# Patient Record
Sex: MALE | Race: WHITE
[De-identification: names, ages, dates, MRNs, and addresses within clinical notes are randomized per-mention and may not be internally consistent; named-entity substitution may affect disease eponyms.]

---

## 2019-06-26 ENCOUNTER — HOSPITAL ENCOUNTER (OUTPATIENT)
Dept: HOSPITAL 47 - RADUSWWP | Age: 59
Discharge: HOME | End: 2019-06-26
Payer: MEDICARE

## 2019-06-26 DIAGNOSIS — N40.0: Primary | ICD-10-CM

## 2019-06-26 PROCEDURE — 76872 US TRANSRECTAL: CPT

## 2019-06-27 NOTE — US
EXAMINATION TYPE: US prostate transrectal

 

DATE OF EXAM: 6/26/2019

 

COMPARISON: NONE

 

CLINICAL HISTORY: R97.2 Elevated PSA levels.

 

This examination was performed using the transrectal probe. 

 

EXAM MEASUREMENTS:

 

Gland Size: 4.7 X 2.8 X 4.6

Volume: 31.24

Predicted PSA:  3.75

Actual PSA (if available):5.5

 

No suspicious sonographic abnormality visualized . Prostate gland is minimally enlarged and mildly he
terogenous.

 

 

 

IMPRESSION:  Mildly enlarged and heterogenous prostate gland without suspicious focal nodule seen son
ographically.

 

 

Predicted PSA = volume x 0.12 ng/ml

Calculated Volume = 0.5236 x L x W x H

## 2019-12-09 ENCOUNTER — HOSPITAL ENCOUNTER (OUTPATIENT)
Dept: HOSPITAL 47 - ORWHC2ENDO | Age: 59
Discharge: HOME | End: 2019-12-09
Attending: INTERNAL MEDICINE
Payer: MEDICARE

## 2019-12-09 VITALS — DIASTOLIC BLOOD PRESSURE: 75 MMHG | SYSTOLIC BLOOD PRESSURE: 137 MMHG | HEART RATE: 62 BPM

## 2019-12-09 VITALS — RESPIRATION RATE: 16 BRPM

## 2019-12-09 VITALS — TEMPERATURE: 98.2 F

## 2019-12-09 VITALS — BODY MASS INDEX: 23.6 KG/M2

## 2019-12-09 DIAGNOSIS — Z87.891: ICD-10-CM

## 2019-12-09 DIAGNOSIS — Z12.11: Primary | ICD-10-CM

## 2019-12-09 DIAGNOSIS — Z88.8: ICD-10-CM

## 2019-12-09 DIAGNOSIS — Z96.21: ICD-10-CM

## 2019-12-09 DIAGNOSIS — K57.30: ICD-10-CM

## 2019-12-09 DIAGNOSIS — D12.3: ICD-10-CM

## 2019-12-09 DIAGNOSIS — Z79.899: ICD-10-CM

## 2019-12-09 DIAGNOSIS — R19.7: ICD-10-CM

## 2019-12-09 PROCEDURE — 45380 COLONOSCOPY AND BIOPSY: CPT

## 2019-12-09 PROCEDURE — 88305 TISSUE EXAM BY PATHOLOGIST: CPT

## 2019-12-09 NOTE — P.PCN
Date of Procedure: 12/09/19


Description of Procedure: 





BRIEF HISTORY: 


Patient is a 59-year-old pleasant male scheduled for an elective colonoscopy as 

a part of screening for malignant neoplasm colon.  Last colonoscopy at the age 

of 48.  Denies any change in bowel habits, abdominal pain, blood per rectum or 

family history of colon cancer.





PROCEDURE PERFORMED: 


Colonoscopy with polypectomy. 





PREOPERATIVE DIAGNOSIS: 


And screening for malignant neoplasm of the colon, last colonoscopy 

approximately 10 years ago. 





ESTIMATED BLOOD LOSS: 


Minimal.





IV sedation per Anesthesia. 





PROCEDURE: 


After informed consent was obtained, the patient, was brought into the endoscopy

unit. IV sedation was administered by Anesthesia under continuous monitoring.  

Digital rectal examination was normal. Initially the Olympus CF-190 flexible 

video colonoscope was then inserted in the rectum, gradually advanced into the 

cecum without any difficulty. Careful examination was performed as the scope was

gradually being withdrawn. Ileocecal valve and the appendiceal orifice were 

visualized and appeared normal.  Prep was excellent. Mucosa of the cecum, 

ascending colon, transverse colon, descending colon, sigmoid colon, and rectum 

appeared normal.  Diminutive 1 mm polyp in the ascending colon removed by cold 

forceps, may represent normal tissue.  One diminutive 2 mm hepatic flexure polyp

removed with cold forcep polypectomy.  One diminutive 3 mm transverse colon 

polyp removed cold forcep polypectomy.  A few scattered small diverticula noted 

in the sigmoid colon.  Retroflexion was performed in the rectum and no lesions 

were seen. The patient tolerated the procedure well. 





IMPRESSION: 


3 diminutive colon polyps removed with cold forcep polypectomy.


Mild sigmoid diverticulosis.





RECOMMENDATIONS:  


Findings of this examination were discussed with the patient and his wife.  Okay

to resume diet.  Okay to resume medications.  Await pathology from 

polypectomies.  Anticipate repeat colonoscopy in 5 years for personal history of

colon polyps pending pathology from polypectomies.

## 2021-06-16 ENCOUNTER — HOSPITAL ENCOUNTER (OUTPATIENT)
Dept: HOSPITAL 47 - LABWHC1 | Age: 61
Discharge: HOME | End: 2021-06-16
Attending: UROLOGY
Payer: MEDICARE

## 2021-06-16 DIAGNOSIS — R97.20: Primary | ICD-10-CM

## 2021-06-16 PROCEDURE — 36415 COLL VENOUS BLD VENIPUNCTURE: CPT

## 2021-06-16 PROCEDURE — 84153 ASSAY OF PSA TOTAL: CPT

## 2021-10-25 ENCOUNTER — HOSPITAL ENCOUNTER (OUTPATIENT)
Dept: HOSPITAL 47 - LABPAT | Age: 61
Discharge: HOME | End: 2021-10-25
Attending: ORTHOPAEDIC SURGERY
Payer: MEDICARE

## 2021-10-25 DIAGNOSIS — Z01.812: Primary | ICD-10-CM

## 2021-10-25 PROCEDURE — 87070 CULTURE OTHR SPECIMN AEROBIC: CPT

## 2021-10-29 ENCOUNTER — HOSPITAL ENCOUNTER (OUTPATIENT)
Dept: HOSPITAL 47 - LABPAT | Age: 61
Discharge: HOME | End: 2021-10-29
Attending: ORTHOPAEDIC SURGERY
Payer: MEDICARE

## 2021-10-29 DIAGNOSIS — M17.12: ICD-10-CM

## 2021-10-29 DIAGNOSIS — Z01.812: Primary | ICD-10-CM

## 2021-10-29 DIAGNOSIS — Z22.322: ICD-10-CM

## 2021-10-29 PROCEDURE — 86850 RBC ANTIBODY SCREEN: CPT

## 2021-10-29 PROCEDURE — 86900 BLOOD TYPING SEROLOGIC ABO: CPT

## 2021-10-29 PROCEDURE — 86901 BLOOD TYPING SEROLOGIC RH(D): CPT

## 2021-11-01 NOTE — HP
HISTORY AND PHYSICAL



CHIEF COMPLAINT:

Left hip pain.



HISTORY OF PRESENT ILLNESS:

The patient is a 61-year-old retired male who presents with progressive left hip pain

for the past 20 years.  He notes groin and thigh pain, worse with weightbearing

activities.  He has been limping.  He notes night symptoms as well.  He has been taking

anti-inflammatories, without much relief.  He notes it significantly limits his

function and activities.



PAST MEDICAL HISTORY:

Significant for hypercholesterolemia, hypertension and diffuse idiopathic skeletal

hyperostosis.



PAST SURGICAL HISTORY:

Negative.



CURRENT MEDICATIONS:

Amlodipine, Celebrex, Nexium, pravastatin, tizanidine.



ALLERGIES:

HE DENIES DRUG ALLERGIES.



FAMILY HISTORY:

Significant for cancer.



SOCIAL HISTORY:

Significant social alcohol use.



REVIEW OF SYSTEMS:

Sixteen-point review of systems is otherwise reviewed and is noncontributory.



PHYSICAL EXAMINATION:

On examination, the patient is approximately 5 feet 10 inches, 180 pounds of

mesomorphic habitus.

HEENT exam is nonfocal.

Neck is supple.

Passive motion of the left hip: Flexion 70 degrees, external rotation with hip flexed

60 degrees, internal rotation 20 degrees with pain.

Clinically he has shortening of the left lower extremity compared to the right.  He has

a mildly antalgic gait pattern.  His distal neurovascular exam appears intact in the

left lower extremity.



AP of the pelvis obtained in the office shows severe left hip osteoarthrosis with bone-

on-bone changes.



IMPRESSION:

1. Left hip severe osteoarthrosis.

2. History of diffuse idiopathic skeletal hyperostosis.



RECOMMENDATIONS:

I talked to the patient at length regarding his condition and treatment options.  At

this point he is quite symptomatic and limited because of pain related to his

osteoarthrosis despite previous conservative measures.  After thorough discussion, he

opted to proceed with surgery.  We will plan to proceed with left total hip

arthroplasty utilizing a lateral approach.  We will institute DVT prophylaxis

postoperatively.





MMODL / IJN: [id number] / Job#: [id number]

## 2021-11-02 ENCOUNTER — HOSPITAL ENCOUNTER (OUTPATIENT)
Dept: HOSPITAL 47 - OR | Age: 61
LOS: 1 days | Discharge: HOME HEALTH | End: 2021-11-03
Attending: ORTHOPAEDIC SURGERY
Payer: MEDICARE

## 2021-11-02 VITALS — RESPIRATION RATE: 18 BRPM

## 2021-11-02 VITALS — BODY MASS INDEX: 25.4 KG/M2

## 2021-11-02 DIAGNOSIS — R97.20: ICD-10-CM

## 2021-11-02 DIAGNOSIS — E78.00: ICD-10-CM

## 2021-11-02 DIAGNOSIS — Z79.1: ICD-10-CM

## 2021-11-02 DIAGNOSIS — Z96.21: ICD-10-CM

## 2021-11-02 DIAGNOSIS — K21.9: ICD-10-CM

## 2021-11-02 DIAGNOSIS — I10: ICD-10-CM

## 2021-11-02 DIAGNOSIS — M16.12: Primary | ICD-10-CM

## 2021-11-02 DIAGNOSIS — M48.10: ICD-10-CM

## 2021-11-02 DIAGNOSIS — Z98.890: ICD-10-CM

## 2021-11-02 DIAGNOSIS — Z80.9: ICD-10-CM

## 2021-11-02 DIAGNOSIS — E78.5: ICD-10-CM

## 2021-11-02 DIAGNOSIS — M35.3: ICD-10-CM

## 2021-11-02 DIAGNOSIS — Z20.822: ICD-10-CM

## 2021-11-02 DIAGNOSIS — Z79.899: ICD-10-CM

## 2021-11-02 PROCEDURE — 97535 SELF CARE MNGMENT TRAINING: CPT

## 2021-11-02 PROCEDURE — 86901 BLOOD TYPING SEROLOGIC RH(D): CPT

## 2021-11-02 PROCEDURE — 85025 COMPLETE CBC W/AUTO DIFF WBC: CPT

## 2021-11-02 PROCEDURE — 36415 COLL VENOUS BLD VENIPUNCTURE: CPT

## 2021-11-02 PROCEDURE — 97161 PT EVAL LOW COMPLEX 20 MIN: CPT

## 2021-11-02 PROCEDURE — 88300 SURGICAL PATH GROSS: CPT

## 2021-11-02 PROCEDURE — 86900 BLOOD TYPING SEROLOGIC ABO: CPT

## 2021-11-02 PROCEDURE — 97165 OT EVAL LOW COMPLEX 30 MIN: CPT

## 2021-11-02 PROCEDURE — 87635 SARS-COV-2 COVID-19 AMP PRB: CPT

## 2021-11-02 PROCEDURE — 73501 X-RAY EXAM HIP UNI 1 VIEW: CPT

## 2021-11-02 PROCEDURE — 27130 TOTAL HIP ARTHROPLASTY: CPT

## 2021-11-02 PROCEDURE — 86850 RBC ANTIBODY SCREEN: CPT

## 2021-11-02 RX ADMIN — POTASSIUM CHLORIDE SCH: 14.9 INJECTION, SOLUTION INTRAVENOUS at 17:20

## 2021-11-02 RX ADMIN — HYDROCODONE BITARTRATE AND ACETAMINOPHEN PRN EACH: 7.5; 325 TABLET ORAL at 17:18

## 2021-11-02 RX ADMIN — POTASSIUM CHLORIDE SCH MLS: 14.9 INJECTION, SOLUTION INTRAVENOUS at 12:21

## 2021-11-02 RX ADMIN — HYDROCODONE BITARTRATE AND ACETAMINOPHEN PRN EACH: 7.5; 325 TABLET ORAL at 21:44

## 2021-11-02 NOTE — XR
Limited left hip

 

HISTORY: Status post left hip arthroplasty

 

Single frontal view of the left hip

 

Patient is status post left hip arthroplasty. There is anatomic alignment. Lucency is present within 
the soft tissues.

 

IMPRESSION: Orthopedic follow-up.

## 2021-11-02 NOTE — P.OP
Date of Procedure: 11/02/21


Preoperative Diagnosis: 


Severe left hip osteoarthrosis


Postoperative Diagnosis: 


Same


Procedure(s) Performed: 


Left total hip arthroplastylateral approach


Implants: 


Depuy [place] 36+1.5 cobalt chrome femoral head, 58 mm [place] acetabular shell 

with neutral polyethylene liner.


Anesthesia: spinal


Surgeon: Emile Goodrich


Assistant #1: Freddy Marin


Estimated Blood Loss (ml): 200


Pathology: other (Femoral head)


Condition: stable


Disposition: PACU


Indications for Procedure: 


51-year-old male presents with progressive left hip pain secondary to 

osteoarthrosis despite conservative measures.  A discussion of the risks and 

benefits of operative intervention versus continued conservative measures was 

made with patient.  He opted proceed with surgery.  Operative risks to include 

infection, neurovascular injury, development of blood clots, leg length 

discrepancy, instability, fracture, possible component loosening/failure and 

need for subsequent procedures was discussed.  Informed consent was obtained.


Operative Findings: 


As below


Description of Procedure: 


The patient was brought to the operating room, and after induction of spinal 

anesthesia was placed in a lateral decubitus position.  The bony prominences 

were appropriately padded.  The pelvis was stable perpendicular to the floor 

with a pegboard.  The left lower extremity was prepped and draped in normal 

fashion.  A 12 cm incision was then made centered over the greater trochanter 

extending superiorly to level the ASIS and distally in line with the femoral 

shaft.  The skin and subcutaneous tissues were divided sharply.  Electrocautery 

was used for hemostasis.  The fascia shubham and gluteus elder fascia was split 

in line with the skin incision.  The muscle fibers were bluntly dissected 

proximally.  A self-retaining retractor was placed.  The anterior and posterior 

margins of the gluteus medius muscles identified and the anterior two thirds was

detached from the greater trochanter with electrocautery.  The gluteus minimus 

tendon was identified and detached in a similar fashion.  A wide capsulotomy was

performed.  The femoral neck fracture was identified in the lower neck cut was 

made approximately 1 1/2 cm above the level of the lesser trochanter with a 

sagittal saw at a 45 the shaft.  The head was then extracted with a corkscrew. 

Attention was then paid towards preparing the acetabular.  Anterior and 

posterior retractors were placed.  The remaining capsular labral tissues 

debrided sharply clearly defining the acetabular margins.  Began reaming with a 

49  mm reamer taking care to initially medialize, then reaming at 45 of 

abduction and 20 of anteversion.  Sequential reaming is performed up to 57 mm. 

This was down to bleeding bony surface.  A trial 58 mm acetabular shell was 

inserted at 45 of abduction and 20 of anteversion.  This was fully seated.  

There was good rim fit and stability.   A neutral polyethylene liner was then 

impacted.  Care taken to avoid any soft tissue interposition.  Attention was 

then paid towards preparing the proximal femur.  A box chisel was used to open 

the metaphyseal region.  A canal finder was used to find the femoral canal.  

Sequential broaching was performed up to a size 13.  This is placed in 15 of 

anteversion with the leg perpendicular floor judging off the trans-epicondylar 

axis.  There is good rotational stability.  A calcar mill was used to fashion 

the medial calcar.  A trial standard neck along with a 36 mm + 1.5 trial head 

was placed.  The hip was gently reduced.  It was taken through range of motion. 

I felt to be stable in flexion and extension with internal and external 

rotation.  I felt there was adequate restoration of soft tissue tension.  The 

hip was gently dislocated.  The trial components removed.  Pulsatile lavage was 

utilized.  The final size 13 standard collared femoral stem was inserted again 

with the leg perpendicular to the floor in 15 of anteversion.  Again there was 

good rotational stability.  A 36 mm + 1.5 cobalt chrome femoral head was gently 

impacted.  The hip was gently reduced.  Again it was taken through motion and 

felt to be stable in flexion and extension with internal and external rotation. 

Pulsatile lavage was again utilized.  With the leg in abduction the gluteus 

minimus and medius tendons reattached to the greater trochanter with #2 Ethibond

suture.  There was minimal drainage therefore a deep drain was not placed.  The 

fascia shubham and gluteus elder fascia was closed with #2 Ethibond suture.  The 

subcutaneous tissues were reapproximated interrupted 2-0 Vicryl sutures.  The s

kin was reapproximated with 3-0 subcuticular strata fix suture.  Skin tape and 

adhesive was applied.  A sterile dressing was applied.  The patient was awoken 

from sedation and transferred to recovery room in good condition.  Blood loss 

was estimated 200 mL.  No complications were incurred.  Sponge and needle counts

were correct in the case.  Freddy HWANG assisted during the major composes 

case to include exposure, implantation, and closure.

## 2021-11-03 VITALS — DIASTOLIC BLOOD PRESSURE: 74 MMHG | SYSTOLIC BLOOD PRESSURE: 125 MMHG | HEART RATE: 73 BPM | TEMPERATURE: 98.4 F

## 2021-11-03 LAB
BASOPHILS # BLD AUTO: 0.02 X 10*3/UL (ref 0–0.1)
BASOPHILS NFR BLD AUTO: 0.2 %
EOSINOPHIL # BLD AUTO: 0 X 10*3/UL (ref 0.04–0.35)
EOSINOPHIL NFR BLD AUTO: 0 %
ERYTHROCYTE [DISTWIDTH] IN BLOOD BY AUTOMATED COUNT: 4 X 10*6/UL (ref 4.4–5.6)
ERYTHROCYTE [DISTWIDTH] IN BLOOD: 12.4 % (ref 11.5–14.5)
GLUCOSE BLD-MCNC: 143 MG/DL (ref 75–99)
HCT VFR BLD AUTO: 35.5 % (ref 39.6–50)
HGB BLD-MCNC: 11.6 G/DL (ref 13–17)
LYMPHOCYTES # SPEC AUTO: 1.24 X 10*3/UL (ref 0.9–5)
LYMPHOCYTES NFR SPEC AUTO: 11 %
MCH RBC QN AUTO: 29 PG (ref 27–32)
MCHC RBC AUTO-ENTMCNC: 32.7 G/DL (ref 32–37)
MCV RBC AUTO: 88.8 FL (ref 80–97)
MONOCYTES # BLD AUTO: 1.13 X 10*3/UL (ref 0.2–1)
MONOCYTES NFR BLD AUTO: 10 %
NEUTROPHILS # BLD AUTO: 8.85 X 10*3/UL (ref 1.8–7.7)
NEUTROPHILS NFR BLD AUTO: 78.4 %
PLATELET # BLD AUTO: 224 X 10*3/UL (ref 140–440)
WBC # BLD AUTO: 11.28 X 10*3/UL (ref 4.5–10)

## 2021-11-03 RX ADMIN — HYDROCODONE BITARTRATE AND ACETAMINOPHEN PRN EACH: 7.5; 325 TABLET ORAL at 10:21

## 2021-11-03 RX ADMIN — HYDROCODONE BITARTRATE AND ACETAMINOPHEN PRN EACH: 7.5; 325 TABLET ORAL at 04:55

## 2021-11-03 NOTE — P.CONS
History of Present Illness





- Reason for Consult


Consult date: 11/03/21


medical Management


Requesting physician: Emile Goodrich





- Chief Complaint


OA of the left hip





- History of Present Illness





This 61-year-old male patient of Dr. Uriarte who presented for an elective left 

hip arthroplasty with Dr. Juan.  Patient has past medical history of GERD, 

hyperlipidemia, hypertension, prostate disorder and thickening of ligaments and 

tendons.  Patient is a postoperative day 1.  Patient reports some discomfort 

around surgical site.  Patient denies any chest pain or shortness breath.  

Patient denies nausea vomiting or diarrhea.  Patient denies any urinary burning 

or frequency.  Patient planning to be DC'd home today per orthopedic services 

with home healthcare





Review of Systems





Please refer to HPI otherwise unremarkable





Past Medical History


Past Medical History: GERD/Reflux, Hyperlipidemia, Hypertension, Musculoskeletal

Disorder, Prostate Disorder


Additional Past Medical History / Comment(s): Elev PSA, being watched.  

Forestier's disease (thickening of ligaments, tendons, joints)


History of Any Multi-Drug Resistant Organisms: None Reported


Past Surgical History: Ear Surgery


Additional Past Surgical History / Comment(s): Colonoscopy.  Rt ear cochlear 

implant


Past Anesthesia/Blood Transfusion Reactions: No Reported Reaction


Past Psychological History: No Psychological Hx Reported


Smoking Status: Never smoker


Past Alcohol Use History: Occasional


Past Drug Use History: None Reported





- Past Family History


  ** Sister(s)


Family Medical History: Cancer





Medications and Allergies


                                Home Medications











 Medication  Instructions  Recorded  Confirmed  Type


 


Celecoxib [CeleBREX] 200 mg PO BID 12/05/19 11/02/21 History


 


Esomeprazole Magnesium [NexIUM] 40 mg PO DAILY PRN 12/05/19 11/02/21 History


 


amLODIPine BESYLATE/BENAZEPRIL 1 cap PO DAILY 12/05/19 11/02/21 History





[amLODIPine BESYLATE/BENAZEPRIL    





10-20 MG]    


 


tiZANidine [Zanaflex] 4 mg PO BID PRN 12/05/19 11/02/21 History


 


Rosuvastatin [Crestor] 20 mg PO HS 10/29/21 11/02/21 History


 


Apixaban [Eliquis] 2.5 mg PO BID #60 tab 11/03/21  Rx


 


Docusate [Colace] 100 mg PO DAILY #30 capsule 11/03/21  Rx


 


HYDROcodone/APAP 7.5-325MG [Norco 1 each PO Q6HR PRN #28 tab 11/03/21  Rx





7.5]    








                                    Allergies











Allergy/AdvReac Type Severity Reaction Status Date / Time


 


No Known Allergies Allergy   Verified 11/02/21 12:23














Physical Exam


Vitals: 


                                   Vital Signs











  Temp Pulse Pulse Pulse Resp BP BP


 


 11/03/21 08:00  98.4 F   73   18  125/74 


 


 11/03/21 01:54  97.9 F   78    120/68 


 


 11/02/21 20:09    86   18  


 


 11/02/21 19:34  97.6 F   86    124/73 


 


 11/02/21 19:21  97.6 F  88     124/73 


 


 11/02/21 19:05    87    150/89 


 


 11/02/21 18:36    98    139/85 


 


 11/02/21 18:06    100    147/87 


 


 11/02/21 17:50    67    132/87 


 


 11/02/21 17:35    95    154/91 


 


 11/02/21 17:21    97    147/86 


 


 11/02/21 17:09  97.8 F  92    18  153/84 


 


 11/02/21 17:06    90    153/84 


 


 11/02/21 15:50   77    16  143/81 


 


 11/02/21 15:35   74    16  139/79 


 


 11/02/21 15:20   72    16  146/74 


 


 11/02/21 15:05   75    16  135/78 


 


 11/02/21 14:53  96.9 F L  80    18  130/70 


 


 11/02/21 12:19  97.9 F    89  16   125/64














  Pulse Ox


 


 11/03/21 08:00  95


 


 11/03/21 01:54  95


 


 11/02/21 20:09 


 


 11/02/21 19:34  96


 


 11/02/21 19:21  96


 


 11/02/21 19:05  94 L


 


 11/02/21 18:36  99


 


 11/02/21 18:06  97


 


 11/02/21 17:50  97


 


 11/02/21 17:35  97


 


 11/02/21 17:21  98


 


 11/02/21 17:09  98


 


 11/02/21 17:06  99


 


 11/02/21 15:50  100


 


 11/02/21 15:35  100


 


 11/02/21 15:20  100


 


 11/02/21 15:05  97


 


 11/02/21 14:53  98


 


 11/02/21 12:19  98








                                Intake and Output











 11/02/21 11/03/21 11/03/21





 22:59 06:59 14:59


 


Intake Total 100  


 


Output Total 820 1250 


 


Balance -720 -1250 


 


Intake:   


 


    


 


Output:   


 


  Urine 820 1250 


 


Other:   


 


  Voiding Method Urinal  


 


  # Voids 1 4 


 


  # Bowel Movements  0 


 


  Weight 82 kg  














Head normocephalic


Neck supple


Lungs clear to auscultation bilaterally no wheezing or crackles


Heart regular rate and rhythm S1-S2, no rub or gallop


Abdomen is soft nontender nondistended positive bowel sounds no hepatosplenom

egaly


Extremities no edema.  Left hip dressings clean dry and intact


Neuro alert and orientated to 3





Results


CBC & Chem 7: 


                                 11/03/21 05:57





Labs: 


                  Abnormal Lab Results - Last 24 Hours (Table)











  11/03/21 11/03/21 Range/Units





  05:57 06:50 


 


WBC  11.28 H   (4.50-10.00)  X 10*3/uL


 


RBC  4.00 L   (4.40-5.60)  X 10*6/uL


 


Hgb  11.6 L   (13.0-17.0)  g/dL


 


Hct  35.5 L   (39.6-50.0)  %


 


Neutrophils #  8.85 H   (1.80-7.70)  X 10*3/uL


 


Monocytes #  1.13 H   (0.20-1.00)  X 10*3/uL


 


Eosinophils #  0 L   (0.04-0.35)  X 10*3/uL


 


POC Glucose (mg/dL)   143 H  (75-99)  mg/dL














Assessment and Plan


Assessment: 





1.   status post left hip arthroplasty on 11/02/2021





2.  History of hyperlipidemia.  Maintained on statin





3.  History of essential hypertension





4.  History of GERD





5.  History of forestier's diseae. 





Thank you for this consultation we will continue follow patient closely thr

oughout stay


Time with Patient: Greater than 30 (Greater than 60% of the total time spent in 

counseling and coordination of care)

## 2021-11-03 NOTE — P.PN
Subjective


Progress Note Date: 11/03/21


Principal diagnosis: 


Left hip osteoarthritis





Patient was seen at bedside this morning resting comfortably sitting up in 

chair.  Patient states physical therapy came by this morning and he got up and 

walked in the [place]arcia and up-and-down a couple steps.  Patient says he has not had 

bowel movement yet, but he says he has passed gas.  Patient says most the pain 

that he has in his hip is located over the incision.  Patient says he has used 

incentive spirometer as well.  Patient denies chest pain, fever, shortness of 

breath, nausea, vomiting, change in vision, loss of bowel/bladder control.








Objective





- Vital Signs


Vital signs: 


                                   Vital Signs











Temp  98.4 F   11/03/21 08:00


 


Pulse  73   11/03/21 08:00


 


Resp  18   11/03/21 08:00


 


BP  125/74   11/03/21 08:00


 


Pulse Ox  95   11/03/21 08:00








                                 Intake & Output











 11/02/21 11/03/21 11/03/21





 18:59 06:59 18:59


 


Intake Total 2051  


 


Output Total 200 2070 


 


Balance 1851 -2070 


 


Weight 82 kg  


 


Intake:   


 


  IV 2051  


 


Output:   


 


  Urine  2070 


 


  Estimated Blood Loss 200  


 


Other:   


 


  Voiding Method  Urinal 


 


  # Voids 1 4 


 


  # Bowel Movements  0 














- Exam


Left hip:


Incision is clean, dry, and intact.  The mesh tape is in good condition.  There 

is minimal soft tissue swelling and ecchymosis surrounding the medial and 

lateral aspects of the incision.  Calf is soft, no tenderness with palpation.  

Plantar flexion, dorsiflexion, EHL, FHL are intact.  Sensory exam to light touch

throughout the extremity is intact, dorsal pedis pulses 2+.








- Labs


CBC & Chem 7: 


                                 11/03/21 05:57





Labs: 


                  Abnormal Lab Results - Last 24 Hours (Table)











  11/03/21 11/03/21 Range/Units





  05:57 06:50 


 


WBC  11.28 H   (4.50-10.00)  X 10*3/uL


 


RBC  4.00 L   (4.40-5.60)  X 10*6/uL


 


Hgb  11.6 L   (13.0-17.0)  g/dL


 


Hct  35.5 L   (39.6-50.0)  %


 


Neutrophils #  8.85 H   (1.80-7.70)  X 10*3/uL


 


Monocytes #  1.13 H   (0.20-1.00)  X 10*3/uL


 


Eosinophils #  0 L   (0.04-0.35)  X 10*3/uL


 


POC Glucose (mg/dL)   143 H  (75-99)  mg/dL














Assessment and Plan


Assessment: 


Postoperative day 1 status post left total hip arthroplasty





Plan: 


1.  Left hip osteoarthritis - left total hip arthroplasty performed yesterday, 

11/02/2021.  Patient stable at bedside this morning.  Plan discharge home today 

with health services





2.  Appreciate medical management





3.  Pain management - going home with Norco 7.5 mg/325 mg





4.  DVT prophylaxis - Lovenox in hospital.  Going home with Eliquis 2.5 mg twice

a day 2 weeks





5.  GI prophylaxis - going home with Colace 100 mg





6.  PT/OT - weightbearing as tolerated with walker for assistance





7.  Encourage incentive spirometer use





8.  Discharge planning - plan discharge home today with health services





Time with Patient: Less than 30

## 2021-11-03 NOTE — P.DS
Providers


Date of admission: 


11/02/2021





Expected date of discharge: 11/03/21


Attending physician: 


Emile Goodrich





Consults: 





                                        





11/02/21 14:49


Consult Physician Routine 


   Consulting Provider: Ji Carbajal


   Consult Reason/Comments: Medical Management s/p left total hip arthroplasty


   Do you want consulting provider notified?: Yes











Primary care physician: 


Santa Uriarte





Hospital Course: 


Date of admission: 11/02/2021


Date of discharge: 11/03/2021





Admission diagnosis: Left hip osteoarthritis


Discharge diagnosis: Same





Attending physician: Dr. Goodrich





Surgical procedures: Left total hip arthroplasty





Brief history: Patient is a 61-year-old male with a history of progressive 

primary left hip osteoarthritis.  At this point patient has failed conservative 

treatment measures and has opted to proceed with a elective left total hip 

arthroplasty.





Hospital course: Details of patient's surgery can be found in operative report. 

Patient tolerated the procedure well and was subsequently transported to 

orthopedic floor.  Patient's orthopeidc and medical care was provided daily. 

Patient had daily laboratory tests performed for evaluation of overall blood 

counts.  Patient had daily physical therapy to include strengthening range of 

motion as well as education with walker ambulation. Patient was treated with 

Lovenox for their postoperative DVT prophylaxis during their inpatient stay.  

Patient was noted to have a relatively uneventful postoperative course.  Patient

reported satisfactory pain control with oral pain medications by postoperative 

day 1.  Patient showed satisfactory progress with physical therapy.  Patient 

moved steadily through the program and had no difficulty meeting the goals by 

postoperative day 1.  Given patient's otherwise satisfactory course and having 

met physical therapy goals, plan is to discharge patient home on postoperative 

day 1.





Discharge condition/disposition: Patient will be discharged home in stable 

condition.





Discharge medications: Instructions are given on resumption of patient's normal 

daily medications per primary care recommendation, in addition patient will be 

prescribed [place] 7.5 mg/325 mg, Colace 100 mg; Eliquis 2.5 mg BID x 2 weeks.





Discharge instructions:


1.  Wound care and infection precautions, keep incision dry and covered while 

showering, no lotions, creams, moisturizers. No soaking, tubs, pools, hottubs. 

Do not scrub over the incision.


2.  Weight-bear as tolerated with walker / cane until follow-up.


3.  Ice and elevate when necessary.  Do not exceed 20 minutes per hour with ice 

pack.


4.  Utilize compression sleeve until seen at first follow up appointment.


5.  Visiting nursing care.


6.  Home physical therapy.


7.  Pain meds and anticoagulants per prescription.


8.  Pain medication has potential to cause constipation. Increase oral fluid and

fiber intake. Contact primary care provider if you have not had a bowel movement

within 48 hours after discharge


9.  No anti-inflammatory medication until discussed at first post operative 

visit, this including Motrin, Aleve, Mobic, Diclofenac.


10.  Follow up in office at 2 weeks postop with Benjamin Polanco PA-C / Freddy Marin PA-C


11. Follow up with your primary care doctor 7-10 days after discharge.


12. Contact [facility] with any questions, [phone].





Keep mesh tape on until follow-up appointment in 2 weeks.  While showering, 

cover mesh tape with Saran wrap











Assessment: 


Left hip osteoarthritis


Procedures: 


Left total hip arthroplasty





Patient Condition at Discharge: Good





Plan - Discharge Summary


Discharge Rx Participant: Yes


New Discharge Prescriptions: 


New


   HYDROcodone/APAP 7.5-325MG [Norco 7.5] 1 each PO Q6HR PRN #28 tab


     PRN Reason: Pain


   Docusate [Colace] 100 mg PO DAILY #30 capsule


   Apixaban [Eliquis] 2.5 mg PO BID #60 tab





No Action


   amLODIPine BESYLATE/BENAZEPRIL [amLODIPine BESYLATE/BENAZEPRIL 10-20 MG] 1 

cap PO DAILY


   Esomeprazole Magnesium [NexIUM] 40 mg PO DAILY PRN


     PRN Reason: GERD


   tiZANidine [Zanaflex] 4 mg PO BID PRN


     PRN Reason: Pain


   Celecoxib [CeleBREX] 200 mg PO BID


   Rosuvastatin [Crestor] 20 mg PO HS


Discharge Medication List





Celecoxib [CeleBREX] 200 mg PO BID 12/05/19 [History]


Esomeprazole Magnesium [NexIUM] 40 mg PO DAILY PRN 12/05/19 [History]


amLODIPine BESYLATE/BENAZEPRIL [amLODIPine BESYLATE/BENAZEPRIL 10-20 MG] 1 cap 

PO DAILY 12/05/19 [History]


tiZANidine [Zanaflex] 4 mg PO BID PRN 12/05/19 [History]


Rosuvastatin [Crestor] 20 mg PO HS 10/29/21 [History]


Apixaban [Eliquis] 2.5 mg PO BID #60 tab 11/03/21 [Rx]


Docusate [Colace] 100 mg PO DAILY #30 capsule 11/03/21 [Rx]


HYDROcodone/APAP 7.5-325MG [Norco 7.5] 1 each PO Q6HR PRN #28 tab 11/03/21 [Rx]








Follow up Appointment(s)/Referral(s): 


[facility], [NON-STAFF] -  ([facility] will call you to schedule 

your home care visits. )


Santa Uriarte MD [Primary Care Provider] - 1 Week


Freddy Marin PAC [PHYSICIAN ASSISTANT] - 11/18/21 2:10 pm


Patient Instructions/Handouts:  Total Hip Replacement (GEN)


Activity/Diet/Wound Care/Special Instructions: 


Discharge instructions:


1.  Wound care and infection precautions, keep incision dry and covered while 

showering, no lotions, creams, moisturizers. No soaking, tubs, pools, hottubs. 

Do not scrub over the incision.


2.  Weight-bear as tolerated with walker / cane until follow-up.


3.  Ice and elevate when necessary.  Do not exceed 20 minutes per hour with ice 

pack.


4.  Utilize compression sleeve until seen at first follow up appointment.


5.  Visiting nursing care.


6.  Home physical therapy.


7.  Pain meds and anticoagulants per prescription.


8.  Pain medication has potential to cause constipation. Increase oral fluid and

fiber intake. Contact primary care provider if you have not had a bowel movement

within 48 hours after discharge


9.  No anti-inflammatory medication until discussed at first post operative 

visit, this including Motrin, Aleve, Mobic, Diclofenac.


10.  Follow up in office at 2 weeks postop with Benjamin Polanco PA-C / Freddy Marin PA-C


11. Follow up with your primary care doctor 7-10 days after discharge.


12. Contact [facility] with any questions, [phone].





Keep mesh tape on until follow-up appointment in 2 weeks.  While showering, 

cover mesh tape with Saran wrap


Discharge Disposition: HOME WITH HOME HEALTH SERVICES

## 2022-03-08 ENCOUNTER — HOSPITAL ENCOUNTER (OUTPATIENT)
Dept: HOSPITAL 47 - LABWHC1 | Age: 62
Discharge: HOME | End: 2022-03-08
Attending: UROLOGY
Payer: MEDICARE

## 2022-03-08 DIAGNOSIS — R97.20: Primary | ICD-10-CM

## 2022-03-08 PROCEDURE — 36415 COLL VENOUS BLD VENIPUNCTURE: CPT

## 2022-03-08 PROCEDURE — 84154 ASSAY OF PSA FREE: CPT

## 2022-03-08 PROCEDURE — 84153 ASSAY OF PSA TOTAL: CPT

## 2022-10-20 ENCOUNTER — HOSPITAL ENCOUNTER (OUTPATIENT)
Dept: HOSPITAL 47 - LABWHC1 | Age: 62
Discharge: HOME | End: 2022-10-20
Attending: UROLOGY
Payer: MEDICARE

## 2022-10-20 DIAGNOSIS — R97.20: Primary | ICD-10-CM

## 2022-10-20 PROCEDURE — 36415 COLL VENOUS BLD VENIPUNCTURE: CPT

## 2022-10-20 PROCEDURE — 84153 ASSAY OF PSA TOTAL: CPT

## 2022-11-29 ENCOUNTER — HOSPITAL ENCOUNTER (OUTPATIENT)
Dept: HOSPITAL 47 - LABPAT | Age: 62
Discharge: HOME | End: 2022-11-29
Attending: ORTHOPAEDIC SURGERY
Payer: MEDICARE

## 2022-11-29 DIAGNOSIS — M16.11: ICD-10-CM

## 2022-11-29 DIAGNOSIS — Z22.322: ICD-10-CM

## 2022-11-29 DIAGNOSIS — I10: ICD-10-CM

## 2022-11-29 DIAGNOSIS — Z01.812: Primary | ICD-10-CM

## 2022-11-29 PROCEDURE — 93005 ELECTROCARDIOGRAM TRACING: CPT

## 2022-11-29 PROCEDURE — 87070 CULTURE OTHR SPECIMN AEROBIC: CPT

## 2022-12-05 NOTE — P.HPOR
History of Present Illness


H&P Date: 12/05/22


Chief Complaint: Right hip pain





The patient is a 61-year-old retired male who presents with progressive right 

hip pain for the past several years worsening recently.  He is having pain with 

weightbearing activities.  Been limping.  He also has night symptoms.  He's 

tried medications without much relief.  He underwent left total hip arthroplasty

last year with good resolution of his symptoms.





Review of Systems





As per HPI





Past Medical History


Past Medical History: GERD/Reflux, Hearing Disorder / Deafness, Hyperlipidemia, 

Hypertension, Musculoskeletal Disorder, Prostate Disorder


Additional Past Medical History / Comment(s): Elev PSA, being watched.  

Forestier's disease (thickening of ligaments, tendons, joints)., sees 

chiropractor.,  cochlear implant right ear, deaf left ear., hearing aid.


History of Any Multi-Drug Resistant Organisms: None Reported


Past Surgical History: Ear Surgery, Joint Replacement


Additional Past Surgical History / Comment(s): Colonoscopy.  Rt ear cochlear 

implant, total left hip.


Past Anesthesia/Blood Transfusion Reactions: No Reported Reaction


Past Psychological History: No Psychological Hx Reported


Smoking Status: Never smoker


Past Alcohol Use History: Occasional


Past Drug Use History: None Reported





- Past Family History


  ** Sister(s)


Family Medical History: Cancer





Medications and Allergies


                                Home Medications











 Medication  Instructions  Recorded  Confirmed  Type


 


Celecoxib [CeleBREX] 200 mg PO BID 12/05/19 12/01/22 History


 


Esomeprazole Magnesium [NexIUM] 40 mg PO DAILY 12/05/19 12/01/22 History


 


amLODIPine BESYLATE/BENAZEPRIL 1 cap PO DAILY 12/05/19 12/01/22 History





[amLODIPine BESYLATE/BENAZEPRIL    





10-20 MG]    


 


tiZANidine [Zanaflex] 4 mg PO HS PRN 12/05/19 12/01/22 History


 


Rosuvastatin [Crestor] 20 mg PO HS 10/29/21 12/01/22 History


 


Acetaminophen [Tylenol Arthritis] 650 mg PO AS DIRECTED PRN 12/01/22 12/01/22 

History


 


Cholecalciferol [Vitamin D3 (25 100 mcg PO DAILY 12/01/22 12/01/22 History





Mcg = 1000 Iu)]    


 


Multicollagen 1 dose PO DAILY 12/01/22  History


 


Vitamin B Complex 1 each PO DAILY 12/01/22 12/01/22 History


 


metHOTREXate sodium [Methotrexate] 15 mg PO Q7D 12/01/22 12/01/22 History








                                    Allergies











Allergy/AdvReac Type Severity Reaction Status Date / Time


 


No Known Allergies Allergy   Verified 12/01/22 15:38














Physical Examination





- Hip


  ** right


Tenderness with palpation: anterior


Pain with motion: internal rotation and hip flexion


ROM: flexion: 80 degrees


ROM: internal rotation: 0 degrees (With pain)


ROM: external rotation: 60 degrees


Strength: flexion: 5/5


Strength: abduction: 5/5


Tests: impingement tests: positive





Results





The patient is a well-developed well-nourished male proximal 5 foot 10, 190 

pounds of mesomorphic habits.  HEENT exam is nonfocal, neck supple.  He is nont

lotus about the lumbar spine.  Straight leg raise is negative.  His distal 

neurovascular appears intact in the right lower extremity.  He does have an 

antalgic gait pattern.





- Diagnostic results


Hip x-ray: image reviewed (Right hip x-rays show severe right hip osteoarthrosis

 with bone-on-bone changes subchondral sclerosis.)





Assessment and Plan


Assessment: 





Right hip severe osteoarthrosissymptomatic


Plan: 





I talked with patient length regarding his condition along with treatment 

options.  This point is quite symptomatic and limited because of pain related to

 his osteoarthrosis despite previous conservative measures.  After thorough 

discussion he opted to proceed with surgery.  We'll plan to proceed with right 

total hip arthroplasty utilizing a lateral approach.  We will institute DVT 

prophylaxis postoperative.  Risks and benefits were discussed at length in 

layman's terms.

## 2022-12-06 ENCOUNTER — HOSPITAL ENCOUNTER (OUTPATIENT)
Dept: HOSPITAL 47 - OR | Age: 62
Setting detail: OBSERVATION
LOS: 1 days | Discharge: HOME HEALTH | End: 2022-12-07
Attending: ORTHOPAEDIC SURGERY | Admitting: ORTHOPAEDIC SURGERY
Payer: MEDICARE

## 2022-12-06 VITALS — BODY MASS INDEX: 27.2 KG/M2

## 2022-12-06 DIAGNOSIS — M35.3: ICD-10-CM

## 2022-12-06 DIAGNOSIS — M48.10: ICD-10-CM

## 2022-12-06 DIAGNOSIS — M16.11: Primary | ICD-10-CM

## 2022-12-06 DIAGNOSIS — K21.9: ICD-10-CM

## 2022-12-06 DIAGNOSIS — H91.92: ICD-10-CM

## 2022-12-06 DIAGNOSIS — Z79.1: ICD-10-CM

## 2022-12-06 DIAGNOSIS — Z96.642: ICD-10-CM

## 2022-12-06 DIAGNOSIS — Z80.8: ICD-10-CM

## 2022-12-06 DIAGNOSIS — G89.18: ICD-10-CM

## 2022-12-06 DIAGNOSIS — I10: ICD-10-CM

## 2022-12-06 DIAGNOSIS — E78.5: ICD-10-CM

## 2022-12-06 PROCEDURE — 27130 TOTAL HIP ARTHROPLASTY: CPT

## 2022-12-06 PROCEDURE — 97535 SELF CARE MNGMENT TRAINING: CPT

## 2022-12-06 PROCEDURE — 97161 PT EVAL LOW COMPLEX 20 MIN: CPT

## 2022-12-06 PROCEDURE — 86900 BLOOD TYPING SEROLOGIC ABO: CPT

## 2022-12-06 PROCEDURE — 86850 RBC ANTIBODY SCREEN: CPT

## 2022-12-06 PROCEDURE — 64461 PVB THORACIC SINGLE INJ SITE: CPT

## 2022-12-06 PROCEDURE — 97166 OT EVAL MOD COMPLEX 45 MIN: CPT

## 2022-12-06 PROCEDURE — 96374 THER/PROPH/DIAG INJ IV PUSH: CPT

## 2022-12-06 PROCEDURE — 73501 X-RAY EXAM HIP UNI 1 VIEW: CPT

## 2022-12-06 PROCEDURE — 85025 COMPLETE CBC W/AUTO DIFF WBC: CPT

## 2022-12-06 PROCEDURE — 86901 BLOOD TYPING SEROLOGIC RH(D): CPT

## 2022-12-06 PROCEDURE — 88300 SURGICAL PATH GROSS: CPT

## 2022-12-06 RX ADMIN — HYDROMORPHONE HYDROCHLORIDE PRN MG: 1 INJECTION, SOLUTION INTRAMUSCULAR; INTRAVENOUS; SUBCUTANEOUS at 20:31

## 2022-12-06 RX ADMIN — HYDROMORPHONE HYDROCHLORIDE PRN MG: 1 INJECTION, SOLUTION INTRAMUSCULAR; INTRAVENOUS; SUBCUTANEOUS at 11:06

## 2022-12-06 RX ADMIN — HYDROCODONE BITARTRATE AND ACETAMINOPHEN PRN EACH: 7.5; 325 TABLET ORAL at 15:56

## 2022-12-06 RX ADMIN — HYDROMORPHONE HYDROCHLORIDE PRN MG: 1 INJECTION, SOLUTION INTRAMUSCULAR; INTRAVENOUS; SUBCUTANEOUS at 10:11

## 2022-12-06 RX ADMIN — POTASSIUM CHLORIDE SCH MLS: 14.9 INJECTION, SOLUTION INTRAVENOUS at 06:27

## 2022-12-06 RX ADMIN — HYDROMORPHONE HYDROCHLORIDE PRN MG: 1 INJECTION, SOLUTION INTRAMUSCULAR; INTRAVENOUS; SUBCUTANEOUS at 12:00

## 2022-12-06 NOTE — XR
EXAMINATION TYPE: XR Hip Limited RT

 

DATE OF EXAM: 12/6/2022

 

Comparison: 10/24/2022

 

Clinical History: 62-year-old male Status post hip surgery, assess surgical alignment

 

Findings:

Image shows placement of right hip total arthroplasty. Acetabular cup and femoral stem components of 
the prosthesis are well seated without periprosthetic fracture. Some scattered soft tissue air relate
d to recent operation. Alignment grossly anatomic.

 

 

Impression:

Uncomplicated postoperative appearance right hip total arthroplasty.

## 2022-12-06 NOTE — P.OP
Date of Procedure: 12/06/22


Preoperative Diagnosis: 


Severe right hip osteoarthrosis


Postoperative Diagnosis: 


Same


Procedure(s) Performed: 


Right total hip arthroplastypress-fitlateral approach


Implants: 


Depuy [place] size [id number] collared press-fit femoral stem, 36+1.5 cobalt chrome 

femoral head, 58 mm [place] acetabular shell with neutral polyethylene liner.


Anesthesia: spinal


Surgeon: Emile Goodrich


Assistant #1: Freddy Marin


Estimated Blood Loss (ml): 200


Pathology: other (Femoral head)


Condition: stable


Disposition: PACU


Indications for Procedure: 


The patient's a 62-year-old male presents with progressive right hip pain 

secondary to osteoarthrosis despite conservative measures.  A discussion of the 

risks and benefits of operative intervention versus continued conservative 

measures was made with the patient.  He opted to proceed with surgery.  

Operative risks to include infection, neurovascular injury, development of blood

clots, fracture, leg length discrepancy, possible instability, possible 

component loosening/failure and need for subsequent procedures was discussed.  

Informed consent was obtained.


Operative Findings: 


As below


Description of Procedure: 


The patient was brought to the operating room, and after induction of spinal 

anesthesia was placed in a lateral decubitus position.  The bony prominences 

were appropriately padded.  The pelvis was stable perpendicular to the floor 

with a pegboard.  The right lower extremity was prepped and draped in normal 

fashion.  A 12 cm incision was then made centered over the greater trochanter 

extending superiorly to level the ASIS and distally in line with the femoral 

shaft.  The skin and subcutaneous tissues were divided sharply.  Electrocautery 

was used for hemostasis.  The fascia shubham and gluteus elder fascia was split i

n line with the skin incision.  The muscle fibers were bluntly dissected 

proximally.  A self-retaining retractor was placed.  The anterior and posterior 

margins of the gluteus medius muscles identified and the anterior two thirds was

detached from the greater trochanter with electrocautery.  The gluteus minimus 

tendon was identified and detached in a similar fashion.  A wide capsulotomy was

performed.  The femoral neck fracture was identified in the lower neck cut was 

made approximately 1 1/2 cm above the level of the lesser trochanter with a 

sagittal saw at a 45 the shaft.  The head was then extracted with a corkscrew. 

Attention was then paid towards preparing the acetabular.  Anterior and 

posterior retractors were placed.  The remaining capsular labral tissues 

debrided sharply clearly defining the acetabular margins.  Began reaming with a 

49 mm reamer taking care to initially medialize, then reaming at 45 of 

abduction and 20 of anteversion.  Sequential reaming is performed up to 57 mm. 

This was down to bleeding bony surface.  A trial 58 mm acetabular shell was 

inserted at 45 of abduction and 20 of anteversion.  This was fully seated.  

There was good rim fit and stability.   A neutral polyethylene liner was then 

impacted.  Care taken to avoid any soft tissue interposition.  Attention was 

then paid towards preparing the proximal femur.  A box chisel was used to open 

the metaphyseal region.  A canal finder was used to find the femoral canal.  

Sequential broaching was performed up to a size 12.  This is placed in 15 of 

anteversion with the leg perpendicular floor judging off the trans-epicondylar 

axis.  There is good rotational stability.  A calcar mill was used to fashion 

the medial calcar.  A trial 125 neck along with a 36 mm + 1.5 trial head was 

placed.  The hip was gently reduced.  It was taken through range of motion.  I 

felt to be stable in flexion and extension with internal and external rotation. 

I felt there was adequate restoration of soft tissue tension.  The hip was 

gently dislocated.  The trial components removed.  Pulsatile lavage was 

utilized.  The final size 12 125 collared femoral stem was inserted again with 

the leg perpendicular to the floor in 15 of anteversion.  Again there was good 

rotational stability.  A 36 mm + 1.5 cobalt chrome femoral head was gently 

impacted.  The hip was gently reduced.  Again it was taken through motion and 

felt to be stable in flexion and extension with internal and external rotation. 

Pulsatile lavage was again utilized.  With the leg in abduction the gluteus 

minimus and medius tendons reattached to the greater trochanter with #2 Ethibond

suture.  There was minimal drainage therefore a deep drain was not placed.  The 

fascia shubham and gluteus elder fascia was closed with #2 Ethibond suture.  The 

subcutaneous tissues were reapproximated interrupted 2-0 Vicryl sutures.  The 

skin was reapproximated with 3-0 subcuticular strata fix suture.  Skin tape and 

adhesive was applied.  A sterile dressing was applied.  The patient was awoken 

from sedation and transferred to recovery room in good condition.  Blood loss 

was estimated 200 mL.  No complications were incurred.  Sponge and needle counts

were correct in the case.  Freddy HWANG assisted during the major composes 

case to include exposure, implantation, and closure.

## 2022-12-06 NOTE — P.CONS
History of Present Illness





- Reason for Consult


Consult date: 12/06/22


HTN


Requesting physician: Emile Goodrich





- Chief Complaint


hip pain





- History of Present Illness


Patient is a 62-year-old male with GERD, hypertension, dyslipidemia, and poor 

hearing with a cochlear implant who presented for elective left total hip 

arthroplasty.  He had no immediate postoperative complications.





Patient seen and examined at bedside.  He still feels as though his left leg is 

slightly numb and heavy.  He denies any postoperative chest pain, shortness of 

breath, nausea, vomiting, dizziness.  He denies any recent cough, cold, fever, 

flu.  He did have his right hip done in the past.  He had been taking 

methotrexate to help with DISH but did not feel was affected and has since 

stopped that medication.





Pertinent positives and negatives as discussed in HPI, a complete review of 

systems was performed and all other systems are negative.








Vital signs reviewed


General: nontoxic, mild distress due to pain, appears at stated age


Derm:  warm, dry


Head: atraumatic, normocephalic, symmetric, [place] 


Eyes: EOMI, no lid lag, anicteric sclera, pupils equal round reactive to light


ENT: Nose and ears atraumatic, no thrush,  no pharyngeal erythema


Neck: No thyromegaly, no cervical lymphadenopathy, trachea midline, supple


Mouth: no lip lesion, mucus membranes moist


Cardiovascular: S1S2 reg, no murmur, positive posterior tibial pulse bilateral, 

no edema, capillary refill less than 2 seconds


Lungs: clear to auscultation bilateral, no rhonchi, no rales, no wheeze, no 

accessory muscle use


Abdominal: soft,  nontender to palpation, no guarding, no appreciable 

organomegaly, normal bowel sounds


Ext: no gross muscle atrophy,  muscle strength 5 out of 5 in upper extremities, 

no contractures


Neuro:  CN II-XII grossly intact, light touch intact all 4 extremities, finger 

to nose within normal limits,


Psych: Alert, oriented, appropriate affect





Assessment/Plan: 


Patient is a 62-year-old male status post left total hip arthroplasty





Hypertension


-Resume amlodipine Benzapril


-Follow blood pressures





Dyslipidemia


-Statin





GERD


-PPI





 Hard of hearing


-Status post cochlear implant





DISH


- restart Celebrex when okay with ortho 





Thank you for allowing us to participate in the care of this pleasant patient.  

Do not hesitate to contact us with questions.  Someone can be reached from the 

[facility] hospitalist group all hours of the day at [phone] or via 

perfect serve.








Past Medical History


Past Medical History: GERD/Reflux, Hearing Disorder / Deafness, Hyperlipidemia, 

Hypertension, Musculoskeletal Disorder, Prostate Disorder


Additional Past Medical History / Comment(s): Elev PSA, being watched.  

Forestier's disease (thickening of ligaments, tendons, joints)., sees 

chiropractor.,  cochlear implant right ear, deaf left ear., hearing aid.


History of Any Multi-Drug Resistant Organisms: None Reported


Past Surgical History: Ear Surgery, Joint Replacement


Additional Past Surgical History / Comment(s): Colonoscopy.  Rt ear cochlear 

implant, total left hip.


Past Anesthesia/Blood Transfusion Reactions: No Reported Reaction


Past Psychological History: No Psychological Hx Reported


Smoking Status: Never smoker


Past Alcohol Use History: Occasional


Past Drug Use History: None Reported





- Past Family History


  ** Sister(s)


Family Medical History: Cancer





Medications and Allergies


                                Home Medications











 Medication  Instructions  Recorded  Confirmed  Type


 


Celecoxib [CeleBREX] 200 mg PO BID 12/05/19 12/06/22 History


 


Esomeprazole Magnesium [NexIUM] 40 mg PO DAILY 12/05/19 12/06/22 History


 


amLODIPine BESYLATE/BENAZEPRIL 1 cap PO DAILY 12/05/19 12/06/22 History





[amLODIPine BESYLATE/BENAZEPRIL    





10-20 MG]    


 


tiZANidine [Zanaflex] 4 mg PO HS PRN 12/05/19 12/06/22 History


 


Rosuvastatin [Crestor] 20 mg PO HS 10/29/21 12/06/22 History


 


Acetaminophen [Tylenol Arthritis] 650 mg PO AS DIRECTED PRN 12/01/22 12/06/22 

History


 


Cholecalciferol [Vitamin D3 (25 100 mcg PO DAILY 12/01/22 12/06/22 History





Mcg = 1000 Iu)]    


 


Multicollagen 1 dose PO DAILY 12/01/22 12/06/22 History


 


Vitamin B Complex 1 each PO DAILY 12/01/22 12/06/22 History


 


metHOTREXate sodium [Methotrexate] 15 mg PO Q7D 12/01/22 12/06/22 History








                                    Allergies











Allergy/AdvReac Type Severity Reaction Status Date / Time


 


No Known Allergies Allergy   Verified 12/06/22 06:16














Physical Exam


Osteopathic Statement: *.  No significant issues noted on an osteopathic 

structural exam other than those noted in the History and Physical/Consult.


Vitals: 


                                   Vital Signs











  Temp Pulse Resp BP BP Pulse Ox


 


 12/06/22 14:00  97.6 F  83  20   124/81  94 L


 


 12/06/22 13:35   83  16   121/72  94 L


 


 12/06/22 13:05   90  16   134/78  93 L


 


 12/06/22 12:41   88  16   128/79  96


 


 12/06/22 12:13   84  16   128/78  96


 


 12/06/22 11:58   80  16   122/73  96


 


 12/06/22 11:10   78  16   118/75  95


 


 12/06/22 10:55   71  17  109/69   95


 


 12/06/22 10:40   75  14  116/67   95


 


 12/06/22 10:25   75  14  113/65   93 L


 


 12/06/22 10:11   82  15  115/66   94 L


 


 12/06/22 09:56   71  15  114/67   96


 


 12/06/22 09:41  97.1 F L  73  12  113/67   95


 


 12/06/22 07:07   69  18  131/74   97


 


 12/06/22 06:31  98.4 F  64  18  160/84   96








                                Intake and Output











 12/06/22 12/06/22 12/06/22





 06:59 14:59 22:59


 


Intake Total 300 1650 


 


Output Total  300 


 


Balance 300 1350 


 


Intake:   


 


   1650 


 


Output:   


 


  Urine  100 


 


  Estimated Blood Loss  200 


 


Other:   


 


  Weight 93.6 kg 93.6 kg

## 2022-12-07 VITALS — SYSTOLIC BLOOD PRESSURE: 146 MMHG | HEART RATE: 70 BPM | TEMPERATURE: 97.9 F | DIASTOLIC BLOOD PRESSURE: 76 MMHG

## 2022-12-07 VITALS — RESPIRATION RATE: 16 BRPM

## 2022-12-07 LAB
BASOPHILS # BLD AUTO: 0.02 X 10*3/UL (ref 0–0.1)
BASOPHILS NFR BLD AUTO: 0.2 %
EOSINOPHIL # BLD AUTO: 0.01 X 10*3/UL (ref 0.04–0.35)
EOSINOPHIL NFR BLD AUTO: 0.1 %
ERYTHROCYTE [DISTWIDTH] IN BLOOD BY AUTOMATED COUNT: 3.77 X 10*6/UL (ref 4.4–5.6)
ERYTHROCYTE [DISTWIDTH] IN BLOOD: 12.7 % (ref 11.5–14.5)
HCT VFR BLD AUTO: 34 % (ref 39.6–50)
HGB BLD-MCNC: 11.4 G/DL (ref 13–17)
IMM GRANULOCYTES BLD QL AUTO: 0.7 %
LYMPHOCYTES # SPEC AUTO: 1.46 X 10*3/UL (ref 0.9–5)
LYMPHOCYTES NFR SPEC AUTO: 12.2 %
MCH RBC QN AUTO: 30.2 PG (ref 27–32)
MCHC RBC AUTO-ENTMCNC: 33.5 G/DL (ref 32–37)
MCV RBC AUTO: 90.2 FL (ref 80–97)
MONOCYTES # BLD AUTO: 1.19 X 10*3/UL (ref 0.2–1)
MONOCYTES NFR BLD AUTO: 9.9 %
NEUTROPHILS # BLD AUTO: 9.2 X 10*3/UL (ref 1.8–7.7)
NEUTROPHILS NFR BLD AUTO: 76.9 %
NRBC BLD AUTO-RTO: 0 /100 WBCS (ref 0–0)
PLATELET # BLD AUTO: 252 X 10*3/UL (ref 140–440)
WBC # BLD AUTO: 11.96 X 10*3/UL (ref 4.5–10)

## 2022-12-07 RX ADMIN — HYDROCODONE BITARTRATE AND ACETAMINOPHEN PRN EACH: 7.5; 325 TABLET ORAL at 02:16

## 2022-12-07 RX ADMIN — RIVAROXABAN SCH: 10 TABLET, FILM COATED ORAL at 09:43

## 2022-12-07 RX ADMIN — RIVAROXABAN SCH MG: 10 TABLET, FILM COATED ORAL at 09:42

## 2022-12-07 RX ADMIN — POTASSIUM CHLORIDE SCH: 14.9 INJECTION, SOLUTION INTRAVENOUS at 03:25

## 2022-12-07 RX ADMIN — HYDROMORPHONE HYDROCHLORIDE PRN MG: 1 INJECTION, SOLUTION INTRAMUSCULAR; INTRAVENOUS; SUBCUTANEOUS at 06:04

## 2022-12-07 RX ADMIN — HYDROCODONE BITARTRATE AND ACETAMINOPHEN PRN EACH: 7.5; 325 TABLET ORAL at 09:45

## 2022-12-07 NOTE — P.DS
Providers


Date of admission: 


12/07/22 07:35





Expected date of discharge: 12/07/22


Attending physician: 


Emile Goodrich





Consults: 





                                        





12/06/22 09:35


Consult Physician Routine 


   Consulting Provider: Mariana Lincoln


   Consult Reason/Comments: Medical Management s/p right total hip arthroplasty


   Do you want consulting provider notified?: Yes











Primary care physician: 


Santa Uriarte





Hospital Course: 


Date of admission: 12/06/2022


Date of discharge: 12/07/2022





Admission diagnosis: Right hip osteoarthritis.


Discharge diagnosis: Same





Attending physician: Dr. Goodrich





Surgical procedures: Right total hip arthroplasty





Brief history: Patient is a 62-year-old male with a history of progressive 

primary right hip osteoarthritis.  At this point patient has failed conservative

treatment measures and has opted to proceed with a elective right total hip 

arthroplasty.





Hospital course: Details of patient's surgery can be found in operative report. 

Patient tolerated the procedure well and was subsequently transported to 

orthopedic floor.  Patient's orthopeidc and medical care was provided daily. 

Patient had daily laboratory tests performed for evaluation of overall blood 

counts.  Patient had daily physical therapy to include strengthening range of 

motion as well as education with walker ambulation. Patient was treated with 

Xarelto for their postoperative DVT prophylaxis during their inpatient stay.  

Patient was noted to have a relatively uneventful postoperative course.  Patient

reported satisfactory pain control with oral pain medications by postoperative 

day 1.  Patient showed satisfactory progress with physical therapy.  Patient 

moved steadily through the program and had no difficulty meeting the goals by 

postoperative day 1.  Given patient's otherwise satisfactory course and having 

met physical therapy goals, plan is to discharge patient home with health 

services on postoperative day 1.





Discharge condition/disposition: Patient will be discharged home with health 

services in stable condition.





Discharge medications: Instructions are given on resumption of patient's normal 

daily medications per primary care recommendation, in addition patient will be 

prescribed Norco 7.5 mg/325 mg; Colace; Zofran; Eliquis 2.5 mg BID x 2 weeks.





Discharge instructions:


1.  Wound care and infection precautions, keep incision dry and covered while 

showering, no lotions, creams, moisturizers. No soaking, tubs, pools, hottubs. 

Do not scrub over the incision.


2.  Weight-bear as tolerated with walker / cane until follow-up.


3.  Ice and elevate when necessary.  Do not exceed 20 minutes per hour with ice 

pack.


4.  Utilize compression sleeve until seen at first follow up appointment.


5.  Visiting nursing care.


6.  Home physical therapy


7.  Pain meds and anticoagulants per prescription.


8.  Pain medication has potential to cause constipation. Increase oral fluid and

fiber intake. Contact primary care provider if you have not had a bowel movement

within 48 hours after discharge


9.  No anti-inflammatory medication until discussed at first post operative 

visit, this including Motrin, Aleve, Mobic, Diclofenac


10.  Follow up in office at 2 weeks postop with Benjamin Polanco PA-C / Freddy Marin PA-C


11. Follow up with your primary care doctor 7-10 days after discharge.


12. Contact [facility] with any questions, [phone].











Assessment: 


Right hip osteoarthritis


Procedures: 





Right total hip arthroplasty


Patient Condition at Discharge: Good





Plan - Discharge Summary


Discharge Rx Participant: Yes


New Discharge Prescriptions: 


New


   HYDROcodone/APAP 7.5-325MG [Norco 7.5] 1 each PO Q6HR PRN #28 tab


     PRN Reason: Pain


   Ondansetron [Zofran] 4 mg PO Q8HR PRN #6 tab


     PRN Reason: Nausea


   Docusate [Colace] 100 mg PO DAILY #30 capsule


   Apixaban [Eliquis] 2.5 mg PO BID #60 tab





Continue


   amLODIPine BESYLATE/BENAZEPRIL [amLODIPine BESYLATE/BENAZEPRIL 10-20 MG] 1 

cap PO DAILY


   Esomeprazole Magnesium [NexIUM] 40 mg PO DAILY


   Rosuvastatin [Crestor] 20 mg PO HS


   Cholecalciferol [Vitamin D3 (25 Mcg = 1000 Iu)] 100 mcg PO DAILY


   Vitamin B Complex 1 each PO DAILY


   Multicollagen 1 dose PO DAILY





No Action


   tiZANidine [Zanaflex] 4 mg PO HS PRN


     PRN Reason: Pain


   Celecoxib [CeleBREX] 200 mg PO BID


   metHOTREXate sodium [Methotrexate] 15 mg PO Q7D


   Acetaminophen [Tylenol Arthritis] 650 mg PO AS DIRECTED PRN


     PRN Reason: Pain


Discharge Medication List





Celecoxib [CeleBREX] 200 mg PO BID 12/05/19 [History]


Esomeprazole Magnesium [NexIUM] 40 mg PO DAILY 12/05/19 [History]


amLODIPine BESYLATE/BENAZEPRIL [amLODIPine BESYLATE/BENAZEPRIL 10-20 MG] 1 cap 

PO DAILY 12/05/19 [History]


tiZANidine [Zanaflex] 4 mg PO HS PRN 12/05/19 [History]


Rosuvastatin [Crestor] 20 mg PO HS 10/29/21 [History]


Acetaminophen [Tylenol Arthritis] 650 mg PO AS DIRECTED PRN 12/01/22 [History]


Cholecalciferol [Vitamin D3 (25 Mcg = 1000 Iu)] 100 mcg PO DAILY 12/01/22 

[History]


Multicollagen 1 dose PO DAILY 12/01/22 [History]


Vitamin B Complex 1 each PO DAILY 12/01/22 [History]


metHOTREXate sodium [Methotrexate] 15 mg PO Q7D 12/01/22 [History]


Apixaban [Eliquis] 2.5 mg PO BID #60 tab 12/07/22 [Rx]


Docusate [Colace] 100 mg PO DAILY #30 capsule 12/07/22 [Rx]


HYDROcodone/APAP 7.5-325MG [Norco 7.5] 1 each PO Q6HR PRN #28 tab 12/07/22 [Rx]


Ondansetron [Zofran] 4 mg PO Q8HR PRN #6 tab 12/07/22 [Rx]








Follow up Appointment(s)/Referral(s): 


Santa Uriarte MD [Primary Care Provider] - 1 Week


Freddy Marin PAC [PHYSICIAN ASSISTANT] - 2 Weeks


[facility], [NON-STAFF] - 1-2 Days ([facility] will call you to 

schedule your in home physical therapy and nursing visits.)


Patient Instructions/Handouts:  Total Hip Replacement (DC)


Activity/Diet/Wound Care/Special Instructions: 


Orthopedic Discharge Instructions:


1.  Wound care and infection precautions, keep incision dry and covered while 

showering, no lotions, creams, moisturizers. No soaking, pools, hot tubs. Do not

scrub over incision.


2.  Weight-bear as tolerated with walker / cane until follow-up.


3.  Ice and elevate when necessary.  Do not exceed 20 minutes per hour with ice 

pack.


4.  Utilize compression sleeve until seen at first follow up appointment.


5.  Pain meds and anticoagulants per prescription.


6.  Pain medication has potential to cause constipation. Increase oral fluid and

fiber intake. Contact primary care provider if you have not had a bowel movement

within 48 hours after discharge.


7.  No anti-inflammatory medication until discussed at first post operative 

visit, this including Motrin, Aleve, Mobic, Diclofenac


8. Follow up in office at 2 weeks postop with Benjamin Polanco PA-C / Freddy Marin PA-C


9. Follow up with your primary care doctor 7-10 days after discharge.


10. Contact [facility] with any questions, [phone].





Keep incision clean, dry, intact.  While showering, cover fusion tape with Saran

wrap.  keep fusion tape on until follow-up appointment in office in 2 weeks


Discharge Disposition: HOME WITH HOME HEALTH SERVICES

## 2022-12-07 NOTE — P.PN
Subjective


Progress Note Date: 12/07/22


Principal diagnosis: 


Right hip osteoarthritis





Patient was seen at bedside this morning resting comfortably sitting up in 

chair.  Patient says he did get up with physical therapy earlier this morning 

and walked around the room and in the hallway and up and down steps.  Patient 

says he has urinated several times since surgery yesterday.  Patient says he has

been passing gas, however, patient says he has not had a bowel movement.  

Patient says he is having smokeless pain however is controlled pain medication. 

Patient says he does have a walker for home.  Patient denies chest pain, fever, 

shortness breath, nausea, vomiting, change in vision, loss of bowel/bladder 

control.








Objective





- Vital Signs


Vital signs: 


                                   Vital Signs











Temp  97.9 F   12/07/22 07:43


 


Pulse  70   12/07/22 09:49


 


Resp  16   12/07/22 09:49


 


BP  146/76   12/07/22 07:43


 


Pulse Ox  96   12/07/22 07:43


 


FiO2      








                                 Intake & Output











 12/06/22 12/07/22 12/07/22





 18:59 06:59 18:59


 


Intake Total 1650  


 


Output Total 300  


 


Balance 1350  


 


Weight 93.6 kg  


 


Intake:   


 


  IV 1650  


 


Output:   


 


  Urine 100  


 


  Estimated Blood Loss 200  


 


Other:   


 


  Voiding Method  Urinal Urinal


 


  # Voids  2 














- Exam


Right hip:


Incision is clean, dry, and intact.  The exofin fusion tape is in good 

condition.  There is minimal soft tissue swelling and ecchymosis surrounding the

medial and lateral aspects of the incision.  Calf is soft, no tenderness with 

palpation.  Plantar flexion, dorsiflexion, EHL, FHL are intact.  Sensory exam to

light touch throughout the extremity is intact, dorsal pedis pulses 2+.








- Labs


CBC & Chem 7: 


                                 12/07/22 06:14





Labs: 


                  Abnormal Lab Results - Last 24 Hours (Table)











  12/07/22 Range/Units





  06:14 


 


WBC  11.96 H  (4.50-10.00)  X 10*3/uL


 


RBC  3.77 L  (4.40-5.60)  X 10*6/uL


 


Hgb  11.4 L  (13.0-17.0)  g/dL


 


Hct  34.0 L  (39.6-50.0)  %


 


Immature Gran #  0.08 H  (0.00-0.04)  X 10*3/uL


 


Neutrophils #  9.20 H  (1.80-7.70)  X 10*3/uL


 


Monocytes #  1.19 H  (0.20-1.00)  X 10*3/uL


 


Eosinophils #  0.01 L  (0.04-0.35)  X 10*3/uL














Assessment and Plan


Assessment: 


1.  Right hip osteoarthritis





- Postop day #1 status post right total hip arthroplasty





Plan: 


1.  Right hip osteoarthritis - right total hip arthroplasty performed yesterday,

Tuesday, 12/06/2022.  Patient stable at bedside this morning.  Patient does have

a walker for home.  Discharge home today with health services.





2.  Appreciate medical management





3.  Pain management - Norco





4.  DVT prophylaxis - Xarelto in hospital.  Going home with Eliquis 2.5 mg BID x

2 weeks





5.  GI prophylaxis - senna in hospital.  Colace once home





6.  PT/OT - weightbearing as tolerated with walker





7.  Encourage incentive spirometer use





8.  Discharge planning - home with health services today.





Time with Patient: Less than 30

## 2022-12-07 NOTE — P.ANPRN
Procedure Note - Anesthesia





- Nerve Block Performed


  ** Right Erector Spinae Single


Time Out Performed: Yes


Date of Procedure: 12/06/22


Procedure Start Time: 07:00


Procedure Stop Time: 07:04


Location of Patient: PreOp


Indication: Acute Post-Operative Pain, Requested by Surgeon


Sedation Type: Sedate with meaningful contact maintained


Preparation: Sterile Prep


Position: Prone


Needle Gauge: 21


Ultrasound used to visualize needle placement: Yes


Ultrasound used to observe medication spread: Yes


Blood Aspirated: No


Pain Paresthesia on Injection Noted: No


Resistance on Injection: Normal


Image Stored and Saved: Yes


Events: Uneventful and Well Tolerated (Ropivacaine 0.5% 15 mL plus normal saline

10 mL plus dexamethasone 4 mg plus fentanyl 25 mics)

## 2022-12-07 NOTE — P.PN
Subjective


Progress Note Date: 12/07/22 (Delayed charting seen at 1020)


Patient is a 62-year-old male with GERD, hypertension, dyslipidemia, and poor 

hearing with a cochlear implant who presented for elective left total hip 

arthroplasty. 





Patient seen and examined at bedside.  After working in therapy today he did 

have an episode of getting hot, diaphoretic, and feeling nauseated.  Reported 

with breast and receiving Zofran.  He has been up and walking some phlegm and it

has not recurred.  He denied any chest pain associated with the episode.  His 

hip pain is currently well controlled.  He does complain of a feeling of 

tightness in his right calf.





General: nontoxic, no distress, appears at stated age


Derm: warm, dry


Head: atraumatic, normocephalic, symmetric


Eyes: EOMI, no lid lag, anicteric sclera


Mouth: no lip lesion, mucus membranes moist


Cardiovascular: S1S2 reg, no murmur, positive posterior tibial pulse bilateral, 


Lungs: CTA bilateral, no rhonchi, no rales , no accessory muscle use


Abdominal: soft,  nontender to palpation, no guarding, no appreciable 

organomegaly


Ext: no gross muscle atrophy, no edema, no contractures, negative Homans, 

negative Isaak


Neuro:  CN II-XI grossly intact, no focal neuro deficits


Psych: Alert, oriented, appropriate affect 





Assessment/Plan: 


Patient is a 62-year-old male status post left total hip arthroplasty





Hypertension


- amlodipine/ Benzapril


-Follow blood pressures





Dyslipidemia


-Statin





GERD


-PPI





 Hard of hearing


-Status post cochlear implant





DISH


- restart Celebrex when okay with ortho 








Patient medically optimized for discharge at the discretion of orthopedic surg

mehnaz.  Home medication reconciliation addressed.








Objective





- Vital Signs


Vital signs: 


                                   Vital Signs











Temp  97.9 F   12/07/22 07:43


 


Pulse  70   12/07/22 09:49


 


Resp  16   12/07/22 09:49


 


BP  146/76   12/07/22 07:43


 


Pulse Ox  96   12/07/22 07:43


 


FiO2      








                                 Intake & Output











 12/06/22 12/07/22 12/07/22





 18:59 06:59 18:59


 


Intake Total 1650  


 


Output Total 300  


 


Balance 1350  


 


Weight 93.6 kg  


 


Intake:   


 


  IV 1650  


 


Output:   


 


  Urine 100  


 


  Estimated Blood Loss 200  


 


Other:   


 


  Voiding Method  Urinal Urinal


 


  # Voids  2 














- Labs


CBC & Chem 7: 


                                 12/07/22 06:14





Labs: 


                  Abnormal Lab Results - Last 24 Hours (Table)











  12/07/22 Range/Units





  06:14 


 


WBC  11.96 H  (4.50-10.00)  X 10*3/uL


 


RBC  3.77 L  (4.40-5.60)  X 10*6/uL


 


Hgb  11.4 L  (13.0-17.0)  g/dL


 


Hct  34.0 L  (39.6-50.0)  %


 


Immature Gran #  0.08 H  (0.00-0.04)  X 10*3/uL


 


Neutrophils #  9.20 H  (1.80-7.70)  X 10*3/uL


 


Monocytes #  1.19 H  (0.20-1.00)  X 10*3/uL


 


Eosinophils #  0.01 L  (0.04-0.35)  X 10*3/uL

## 2023-10-06 ENCOUNTER — HOSPITAL ENCOUNTER (OUTPATIENT)
Dept: HOSPITAL 47 - LABWHC1 | Age: 63
Discharge: HOME | End: 2023-10-06
Attending: DENTIST
Payer: MEDICARE

## 2023-10-06 DIAGNOSIS — Z00.00: Primary | ICD-10-CM

## 2023-10-06 LAB
ALP SERPL-CCNC: 123 U/L (ref 41–126)
CALCIUM SPEC-MCNC: 9.4 MG/DL (ref 8.7–10.3)

## 2023-10-06 PROCEDURE — 84075 ASSAY ALKALINE PHOSPHATASE: CPT

## 2023-10-06 PROCEDURE — 82310 ASSAY OF CALCIUM: CPT

## 2023-10-06 PROCEDURE — 83970 ASSAY OF PARATHORMONE: CPT

## 2023-10-06 PROCEDURE — 36415 COLL VENOUS BLD VENIPUNCTURE: CPT

## 2025-03-14 ENCOUNTER — HOSPITAL ENCOUNTER (OUTPATIENT)
Dept: HOSPITAL 47 - ORWHC2ENDO | Age: 65
Discharge: HOME | End: 2025-03-14
Attending: INTERNAL MEDICINE
Payer: MEDICARE

## 2025-03-14 VITALS — RESPIRATION RATE: 16 BRPM | SYSTOLIC BLOOD PRESSURE: 129 MMHG | DIASTOLIC BLOOD PRESSURE: 72 MMHG | HEART RATE: 71 BPM

## 2025-03-14 VITALS — TEMPERATURE: 97.7 F

## 2025-03-14 VITALS — BODY MASS INDEX: 30.1 KG/M2

## 2025-03-14 DIAGNOSIS — K21.9: ICD-10-CM

## 2025-03-14 DIAGNOSIS — Z89.512: ICD-10-CM

## 2025-03-14 DIAGNOSIS — Z79.899: ICD-10-CM

## 2025-03-14 DIAGNOSIS — K64.1: ICD-10-CM

## 2025-03-14 DIAGNOSIS — N40.0: ICD-10-CM

## 2025-03-14 DIAGNOSIS — Z89.511: ICD-10-CM

## 2025-03-14 DIAGNOSIS — Z86.0100: ICD-10-CM

## 2025-03-14 DIAGNOSIS — I10: ICD-10-CM

## 2025-03-14 DIAGNOSIS — K57.30: ICD-10-CM

## 2025-03-14 DIAGNOSIS — E78.5: ICD-10-CM

## 2025-03-14 DIAGNOSIS — Z12.11: Primary | ICD-10-CM

## 2025-03-14 DIAGNOSIS — Z90.89: ICD-10-CM

## 2025-03-14 RX ADMIN — POTASSIUM CHLORIDE SCH MLS/HR: 14.9 INJECTION, SOLUTION INTRAVENOUS at 13:29

## 2025-03-14 RX ADMIN — POTASSIUM CHLORIDE ONE MLS: 14.9 INJECTION, SOLUTION INTRAVENOUS at 13:29

## 2025-03-14 NOTE — P.PCN
Date of Procedure: 03/14/25


Procedure(s) Performed: 


BRIEF HISTORY: Patient is a 64-year-old pleasant white male scheduled for an 

elective colonoscopy as a part of screening for history of colon polyps with 

last colonoscopy was 5 years ago and was noted to have a tubular adenoma.








PROCEDURE PERFORMED: Colonoscopy. 





PREOPERATIVE DIAGNOSIS: Screening for history of colon polyps.. 





IV sedation per Anesthesia. 





PROCEDURE: After informed consent was obtained, the patient, was brought into 

the endoscopy unit. IV sedation was administered by Anesthesia under continuous 

monitoring.  Digital rectal examination was normal. Initially the Olympus CF-160

flexible video colonoscope was then inserted in the rectum, gradually advanced 

into the cecum without any difficulty. Careful examination was performed as the 

scope was gradually being withdrawn. Ileocecal valve and the appendiceal orifice

were visualized and appeared normal.  Prep was excellent. Mucosa of the cecum, 

ascending colon, transverse colon, descending colon, sigmoid colon, and rectum 

appeared normal.  Scattered sigmoid diverticulosis.  Retroflexion was performed 

in the rectum and grade 2 internal hemorrhoids were seen. The patient tolerated 

the procedure well. 





IMPRESSION: 


Normal-appearing colon from rectum to cecum with no episode colorectal neoplasia


Grade 2 internal hemorrhoids


Scattered sigmoid diverticulosis.





RECOMMENDATIONS:  Findings of this examination were discussed with the patient 

as well as her family.  She was advised to have repeat screening colonoscopy in 

10 years.